# Patient Record
Sex: MALE | URBAN - METROPOLITAN AREA
[De-identification: names, ages, dates, MRNs, and addresses within clinical notes are randomized per-mention and may not be internally consistent; named-entity substitution may affect disease eponyms.]

---

## 2022-12-07 ENCOUNTER — LAB OUTSIDE AN ENCOUNTER (OUTPATIENT)
Dept: URBAN - METROPOLITAN AREA CLINIC 25 | Facility: CLINIC | Age: 27
End: 2022-12-07

## 2022-12-07 ENCOUNTER — OFFICE VISIT (OUTPATIENT)
Dept: URBAN - METROPOLITAN AREA CLINIC 25 | Facility: CLINIC | Age: 27
End: 2022-12-07
Payer: MEDICAID

## 2022-12-07 VITALS
SYSTOLIC BLOOD PRESSURE: 109 MMHG | WEIGHT: 132 LBS | DIASTOLIC BLOOD PRESSURE: 75 MMHG | HEIGHT: 67 IN | TEMPERATURE: 97.5 F | BODY MASS INDEX: 20.72 KG/M2 | HEART RATE: 102 BPM

## 2022-12-07 DIAGNOSIS — Z93.1 PEG (PERCUTANEOUS ENDOSCOPIC GASTROSTOMY) STATUS: ICD-10-CM

## 2022-12-07 DIAGNOSIS — R74.8 ELEVATED ALKALINE PHOSPHATASE LEVEL: ICD-10-CM

## 2022-12-07 DIAGNOSIS — R13.10 ESOPHAGEAL DYSPHAGIA: ICD-10-CM

## 2022-12-07 PROCEDURE — 99204 OFFICE O/P NEW MOD 45 MIN: CPT | Performed by: INTERNAL MEDICINE

## 2022-12-07 RX ORDER — TRAZODONE HYDROCHLORIDE 50 MG/1
TABLET ORAL
Qty: 30 TABLET | Status: ACTIVE | COMMUNITY

## 2022-12-07 RX ORDER — FLUTICASONE PROPIONATE 50 UG/1
SPRAY, METERED NASAL
Qty: 16 GRAM | Status: ACTIVE | COMMUNITY

## 2022-12-07 RX ORDER — COVID-19 ANTIGEN TEST
KIT MISCELLANEOUS
Qty: 2 EACH | Status: ACTIVE | COMMUNITY

## 2022-12-07 RX ORDER — AMLODIPINE BESYLATE 10 MG/1
TABLET ORAL
Qty: 30 TABLET | Status: ACTIVE | COMMUNITY

## 2022-12-07 RX ORDER — ENOXAPARIN SODIUM 40 MG/.4ML
INJECT 0.4ML(40MG) UNDER THE SKIN NIGHTLY INJECTION, SOLUTION INTRAVENOUS; SUBCUTANEOUS
Qty: 12 MILLILITER | Refills: 0 | Status: ACTIVE | COMMUNITY

## 2022-12-07 RX ORDER — BACLOFEN 10 MG/1
TABLET ORAL
Qty: 90 TABLET | Status: ACTIVE | COMMUNITY

## 2022-12-07 RX ORDER — PANTOPRAZOLE SODIUM 40 MG/1
TABLET, DELAYED RELEASE ORAL
Qty: 30 TABLET | Status: ACTIVE | COMMUNITY

## 2022-12-07 RX ORDER — SENNOSIDES 8.6 MG/1
TABLET, COATED ORAL
Qty: 12 TABLET | Status: ACTIVE | COMMUNITY

## 2022-12-07 RX ORDER — TERAZOSIN HYDROCHLORIDE 10 MG/1
CAPSULE ORAL
Qty: 30 CAPSULE | Status: ACTIVE | COMMUNITY

## 2022-12-07 RX ORDER — LABETALOL HYDROCHLORIDE 300 MG/1
TABLET, FILM COATED ORAL
Qty: 60 TABLET | Status: ACTIVE | COMMUNITY

## 2022-12-07 RX ORDER — TRAMADOL HYDROCHLORIDE 50 MG/1
PLEASE SEE ATTACHED FOR DETAILED DIRECTIONS TABLET, FILM COATED ORAL
Qty: 20 EACH | Refills: 0 | Status: ACTIVE | COMMUNITY

## 2022-12-07 RX ORDER — HYDRALAZINE HYDROCHLORIDE 50 MG/1
TABLET ORAL
Qty: 90 TABLET | Status: ACTIVE | COMMUNITY

## 2022-12-07 RX ORDER — DIPHENHYDRAMINE HYDROCHLORIDE 25 MG/1
CAPSULE ORAL
Qty: 60 CAPSULE | Status: ACTIVE | COMMUNITY

## 2022-12-07 RX ORDER — LEVETIRACETAM 100 MG/ML
SOLUTION ORAL
Qty: 480 MILLILITER | Status: ACTIVE | COMMUNITY

## 2022-12-07 NOTE — HPI-TODAY'S VISIT:
December 22 visit: Patient was referred for dysphagia by his PCP Dr. Darien Dao.  Labs from November 22 revealed no anemia.  Alkaline phosphatase is elevated to 239 with other LFTs being normal.  Language line  was offered and used. pt is here with wife.  wheelchair bound. pt was hospitalized for 5 months following intracranial bleeding as per wifes report. seeing neurosurgery. was hospitalized at [facility]. peg tube was placed - in place for 6-8 months atleast. here to discuss peg tube removal / swallow eval. we dont have any of his records. has been able to eat rice porridge po. not using tube feeds.

## 2022-12-18 LAB
ALBUMIN/GLOBULIN RATIO: 1.4
ALBUMIN: 4.2
ALKALINE PHOSPHATASE: 166
ALKALINE PHOSPHATASE: 166
ALT (SGPT): 12
AST (SGOT): 8
BILIRUBIN, DIRECT: 0.1
BILIRUBIN, INDIRECT: 0.4
BILIRUBIN, TOTAL: 0.5
BONE ISOENZYMES: 45
GGT: 31
GLOBULIN: 2.9
INTESTINAL ISOENZYMES: 0
LIVER ISOENZYMES: 55
MACROHEPATIC ISOENZYMES: 0
MITOCHONDRIAL (M2) ANTIBODY: <=20
PLACENTAL ISOENZYMES: 0
PROTEIN, TOTAL: 7.1

## 2022-12-22 ENCOUNTER — OFFICE VISIT (OUTPATIENT)
Dept: URBAN - METROPOLITAN AREA CLINIC 91 | Facility: CLINIC | Age: 27
End: 2022-12-22

## 2022-12-27 ENCOUNTER — TELEPHONE ENCOUNTER (OUTPATIENT)
Dept: URBAN - METROPOLITAN AREA CLINIC 25 | Facility: CLINIC | Age: 27
End: 2022-12-27

## 2023-01-20 ENCOUNTER — P2P PATIENT RECORD (OUTPATIENT)
Age: 28
End: 2023-01-20

## 2023-01-30 ENCOUNTER — DASHBOARD ENCOUNTERS (OUTPATIENT)
Age: 28
End: 2023-01-30

## 2023-01-30 ENCOUNTER — OFFICE VISIT (OUTPATIENT)
Dept: URBAN - METROPOLITAN AREA CLINIC 25 | Facility: CLINIC | Age: 28
End: 2023-01-30
Payer: MEDICAID

## 2023-01-30 VITALS
BODY MASS INDEX: 20.72 KG/M2 | HEIGHT: 67 IN | TEMPERATURE: 97.6 F | DIASTOLIC BLOOD PRESSURE: 67 MMHG | HEART RATE: 101 BPM | WEIGHT: 132 LBS | SYSTOLIC BLOOD PRESSURE: 108 MMHG

## 2023-01-30 DIAGNOSIS — R74.8 ELEVATED ALKALINE PHOSPHATASE LEVEL: ICD-10-CM

## 2023-01-30 DIAGNOSIS — R13.10 ESOPHAGEAL DYSPHAGIA: ICD-10-CM

## 2023-01-30 DIAGNOSIS — Z93.1 PEG (PERCUTANEOUS ENDOSCOPIC GASTROSTOMY) STATUS: ICD-10-CM

## 2023-01-30 PROBLEM — 302109006: Status: ACTIVE | Noted: 2022-12-07

## 2023-01-30 PROBLEM — 40890009 ESOPHAGEAL DYSPHAGIA: Status: ACTIVE | Noted: 2022-12-06

## 2023-01-30 PROCEDURE — 99212 OFFICE O/P EST SF 10 MIN: CPT | Performed by: INTERNAL MEDICINE

## 2023-01-30 RX ORDER — TERAZOSIN HYDROCHLORIDE 10 MG/1
CAPSULE ORAL
Qty: 30 CAPSULE | Status: ACTIVE | COMMUNITY

## 2023-01-30 RX ORDER — LEVETIRACETAM 100 MG/ML
SOLUTION ORAL
Qty: 480 MILLILITER | Status: ACTIVE | COMMUNITY

## 2023-01-30 RX ORDER — PANTOPRAZOLE SODIUM 40 MG/1
TABLET, DELAYED RELEASE ORAL
Qty: 30 TABLET | Status: ACTIVE | COMMUNITY

## 2023-01-30 RX ORDER — COVID-19 ANTIGEN TEST
KIT MISCELLANEOUS
Qty: 2 EACH | Status: ACTIVE | COMMUNITY

## 2023-01-30 RX ORDER — TRAZODONE HYDROCHLORIDE 50 MG/1
TABLET ORAL
Qty: 30 TABLET | Status: ACTIVE | COMMUNITY

## 2023-01-30 RX ORDER — HYDRALAZINE HYDROCHLORIDE 50 MG/1
TABLET ORAL
Qty: 90 TABLET | Status: ACTIVE | COMMUNITY

## 2023-01-30 RX ORDER — BACLOFEN 10 MG/1
TABLET ORAL
Qty: 90 TABLET | Status: ACTIVE | COMMUNITY

## 2023-01-30 RX ORDER — ENOXAPARIN SODIUM 40 MG/.4ML
INJECT 0.4ML(40MG) UNDER THE SKIN NIGHTLY INJECTION, SOLUTION INTRAVENOUS; SUBCUTANEOUS
Qty: 12 MILLILITER | Refills: 0 | Status: ACTIVE | COMMUNITY

## 2023-01-30 RX ORDER — DIPHENHYDRAMINE HYDROCHLORIDE 25 MG/1
CAPSULE ORAL
Qty: 60 CAPSULE | Status: ACTIVE | COMMUNITY

## 2023-01-30 RX ORDER — AMLODIPINE BESYLATE 10 MG/1
TABLET ORAL
Qty: 30 TABLET | Status: ACTIVE | COMMUNITY

## 2023-01-30 RX ORDER — FLUTICASONE PROPIONATE 50 UG/1
SPRAY, METERED NASAL
Qty: 16 GRAM | Status: ACTIVE | COMMUNITY

## 2023-01-30 RX ORDER — TRAMADOL HYDROCHLORIDE 50 MG/1
PLEASE SEE ATTACHED FOR DETAILED DIRECTIONS TABLET, FILM COATED ORAL
Qty: 20 EACH | Refills: 0 | Status: ACTIVE | COMMUNITY

## 2023-01-30 RX ORDER — SENNOSIDES 8.6 MG/1
TABLET, COATED ORAL
Qty: 12 TABLET | Status: ACTIVE | COMMUNITY

## 2023-01-30 RX ORDER — LABETALOL HYDROCHLORIDE 300 MG/1
TABLET, FILM COATED ORAL
Qty: 60 TABLET | Status: ACTIVE | COMMUNITY

## 2023-01-30 NOTE — HPI-OTHER HISTORIES
December 22 visit: Patient was referred for dysphagia by his PCP Dr. Darien Dao.  Labs from November 22 revealed no anemia.  Alkaline phosphatase is elevated to 239 with other LFTs being normal.  Language line  was offered and used. pt is here with wife.  wheelchair bound. pt was hospitalized for 5 months following intracranial bleeding as per wifes report. seeing neurosurgery. was hospitalized at [facility]. peg tube was placed - in place for 6-8 months atleast. here to discuss peg tube removal / swallow eval. we dont have any of his records. has been able to eat rice porridge po. not using tube feeds.

## 2023-01-30 NOTE — HPI-TODAY'S VISIT:
January 2023 visit: Language line  was offered and used. Labs from December 22 revealed a elevated alkaline phosphatase of 166, otherwise normal LFTs, mitochondrial antibody negative, normal GGT. Patient also underwent a speech eval and was cleared to have p.o. intake. Abdominal ultrasound from January 2023 revealed normal liver, patent portal vein, normal bile duct and gallbladder.  Incidental 11 mm right kidney stone seen. Patient went into IR to have PEG tube removed but was noted to have a seizure and therefore sent to ER.  G-tube was removed on 1/17/2023. here with wife. tolerating po intake. no new symptoms reported by wife.  is nonverbal.